# Patient Record
Sex: MALE | Race: WHITE | Employment: UNEMPLOYED | URBAN - METROPOLITAN AREA
[De-identification: names, ages, dates, MRNs, and addresses within clinical notes are randomized per-mention and may not be internally consistent; named-entity substitution may affect disease eponyms.]

---
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
## 2022-07-25 NOTE — TELEPHONE ENCOUNTER
Spoke with mother; Keaton Ramírez has been tolerating Genie Hunting well, current doing half feeds with Genie Hunting and half with Vivonex. He seems to reflux more with Vivonex than Genie Hunting. Currently feeding 3 hours on pump and 1 hour off; around the clock. Discussed with mother to transition and order Genie Hunting; will d/c vivonex, prosource and MCT oil. Discussed introducing more Nourish at a later date. Mother expressed understanding and comfortable with plan. Detail Level: Simple

## 2025-04-29 NOTE — PROGRESS NOTES
OK per Dr. Rasmussen Alert telephone encounter The patient is Stable - Low risk of patient condition declining or worsening    Shift Goals  Clinical Goals: manage pain, fall prevention  Patient Goals: sleep  Family Goals: not present    Progress made toward(s) clinical / shift goals:    Problem: Pain - Standard  Goal: Alleviation of pain or a reduction in pain to the patient’s comfort goal  Outcome: Progressing  Patient has not reported any pain during my shift so far.     Problem: Knowledge Deficit - Standard  Goal: Patient and family/care givers will demonstrate understanding of plan of care, disease process/condition, diagnostic tests and medications  Outcome: Progressing  Patient demonstrates an understanding of plan of care, disease process/condition, diagnostic tests and medications        Patient is not progressing towards the following goals: